# Patient Record
Sex: MALE | URBAN - NONMETROPOLITAN AREA
[De-identification: names, ages, dates, MRNs, and addresses within clinical notes are randomized per-mention and may not be internally consistent; named-entity substitution may affect disease eponyms.]

---

## 2023-03-15 ENCOUNTER — OFFICE VISIT (OUTPATIENT)
Dept: ENT CLINIC | Age: 73
End: 2023-03-15
Payer: MEDICARE

## 2023-03-15 ENCOUNTER — PROCEDURE VISIT (OUTPATIENT)
Dept: ENT CLINIC | Age: 73
End: 2023-03-15
Payer: MEDICARE

## 2023-03-15 VITALS
BODY MASS INDEX: 25.48 KG/M2 | SYSTOLIC BLOOD PRESSURE: 116 MMHG | DIASTOLIC BLOOD PRESSURE: 66 MMHG | HEIGHT: 70 IN | WEIGHT: 178 LBS

## 2023-03-15 DIAGNOSIS — H91.8X9 ASYMMETRICAL HEARING LOSS: ICD-10-CM

## 2023-03-15 DIAGNOSIS — H92.02 LEFT EAR PAIN: Primary | ICD-10-CM

## 2023-03-15 DIAGNOSIS — H92.02 ACUTE OTALGIA, LEFT: Primary | ICD-10-CM

## 2023-03-15 PROCEDURE — 92567 TYMPANOMETRY: CPT | Performed by: AUDIOLOGIST

## 2023-03-15 PROCEDURE — 99203 OFFICE O/P NEW LOW 30 MIN: CPT | Performed by: PHYSICIAN ASSISTANT

## 2023-03-15 PROCEDURE — 1123F ACP DISCUSS/DSCN MKR DOCD: CPT | Performed by: PHYSICIAN ASSISTANT

## 2023-03-15 RX ORDER — LANSOPRAZOLE 30 MG/1
CAPSULE, DELAYED RELEASE ORAL
COMMUNITY
Start: 2023-01-05

## 2023-03-15 RX ORDER — SERTRALINE HYDROCHLORIDE 100 MG/1
TABLET, FILM COATED ORAL
COMMUNITY
Start: 2023-02-28

## 2023-03-15 RX ORDER — LISINOPRIL 10 MG/1
TABLET ORAL
COMMUNITY

## 2023-03-15 RX ORDER — ATORVASTATIN CALCIUM 20 MG/1
TABLET, FILM COATED ORAL
COMMUNITY

## 2023-03-15 RX ORDER — DICLOFENAC SODIUM 75 MG/1
TABLET, DELAYED RELEASE ORAL
COMMUNITY

## 2023-03-15 RX ORDER — FLUTICASONE PROPIONATE 50 MCG
SPRAY, SUSPENSION (ML) NASAL
COMMUNITY

## 2023-03-15 ASSESSMENT — ENCOUNTER SYMPTOMS
SINUS PAIN: 0
SORE THROAT: 0
RHINORRHEA: 0
PHOTOPHOBIA: 0
EYE PAIN: 0
SINUS PRESSURE: 0
VOICE CHANGE: 0
FACIAL SWELLING: 0
TROUBLE SWALLOWING: 0

## 2023-03-15 NOTE — PROGRESS NOTES
Mr. Ivonne Schneider presented to the clinic this date for tympanometry due to complaints of left ear pain. Type A tympanograms consistent with normal TM mobility bilaterally.

## 2023-03-15 NOTE — ASSESSMENT & PLAN NOTE
Sporadic acute otalgia with asymmetrical hearing loss of the left ear  Plan: I recommended a CT of the left IAC due to a normal tympanogram and no success with conservative medications. He is to follow-up after the CAT scan to discuss the findings. I will also have him to see Elin for audiology screening prior to his appointment with me. He was reminded to call if he has any questions or problems.

## 2023-03-15 NOTE — PROGRESS NOTES
[facility] OTOLARYNGOLOGY/ENT  Mr. Chantal Charles is a pleasant 70-year-old  male that was referred by Dr. Tono Chiu due to problems with chronic otalgia to the left ear. Patient reports that this was first noted about 3 months ago. He reports that he has had 2 recent episodes of COVID and thinks this may be the etiology. He reports that the ear feels plugged up with no drainage to the canal.  He reports of sporadic stabbing pain to the left ear with no trigger points. He also denies any issues with fever or chills. He was treated with several different medications after 3 encounters with no success. Due to this persisting he is referred today for evaluation and treatment. Patient does admit to a longstanding history of seasonal allergies and has required allergy shots in the past.  Tympanogram was performed today and demonstrated type A bilaterally. Allergies: Molds & smuts      Current Outpatient Medications   Medication Sig Dispense Refill    Cetirizine HCl 10 MG CAPS cetirizine 10 mg capsule   cetirizine 10 mg capsule 1 Capsule(s) PO Take once a day      diclofenac (VOLTAREN) 75 MG EC tablet diclofenac sodium 75 mg tablet,delayed release   TAKE ONE TABLET BY MOUTH TWICE DAILY FOR 90 DAYS      atorvastatin (LIPITOR) 20 MG tablet atorvastatin 20 mg tablet   Take 1 tablet every day by oral route for 90 days. fluticasone (FLONASE) 50 MCG/ACT nasal spray fluticasone propionate 50 mcg/actuation nasal spray,suspension   INHALE TWO SPRAYS INTO EACH NOSTRIL EVERY DAY FOR 30 DAYS      lansoprazole (PREVACID) 30 MG delayed release capsule TAKE ONE CAPSULE BY MOUTH EVERY DAY      sertraline (ZOLOFT) 100 MG tablet TAKE ONE TABLET BY MOUTH EVERY DAY FOR 90 DAYS      lisinopril (PRINIVIL;ZESTRIL) 10 MG tablet lisinopril 10 mg tablet   Take 1 tablet every day by oral route for 90 days. No current facility-administered medications for this visit.        Past Surgical History:   Procedure Laterality Date    CARPAL TUNNEL RELEASE      KNEE ARTHROPLASTY      SHOULDER ARTHROPLASTY         Past Medical History:   Diagnosis Date    Anxiety and depression     Arthritis     Back pain     GERD (gastroesophageal reflux disease)     Hyperlipidemia     Hypertension        No family history on file.    Social History     Tobacco Use    Smoking status: Never    Smokeless tobacco: Never   Substance Use Topics    Alcohol use: Yes     Comment: rare           REVIEW OF SYSTEMS:  all other systems reviewed and are negative  Review of Systems   Constitutional:  Negative for chills and fever.   HENT:  Negative for congestion, dental problem, ear discharge, ear pain, facial swelling, hearing loss, postnasal drip, rhinorrhea, sinus pressure, sinus pain, sore throat, tinnitus, trouble swallowing and voice change.    Eyes:  Negative for photophobia and pain.   Neurological:  Negative for dizziness and headaches.         Comments:     PHYSICAL EXAM:    /66   Ht 5' 10\" (1.778 m)   Wt 178 lb (80.7 kg)   BMI 25.54 kg/m²   Body mass index is 25.54 kg/m².    General Appearance: well developed  and well nourished  Head/ Face: normocephalic and atraumatic  Vocal Quality: good/ normal  Ears: Right Ear: External: external ears normal Otoscopy Ear Canal: canal clear Otoscopy TM: TM's dull and TM's sluggish Left Ear: External: external ears normal Otoscopy Ear Canal: canal clear Otoscopy TM: TM's dull and TM's sluggish  Hearing: Victoria L  Nose: nares normal and septum midline  Neck: supple and adenopathy none palpable  Thyroid: normal  Oral exam demonstrated no masses or any significant findings.   + frontal sinus tenderness to percussion. L>R    Assessment & Plan:    Problem List Items Addressed This Visit       Acute otalgia, left - Primary     Sporadic acute otalgia with asymmetrical hearing loss of the left ear  Plan: I recommended a CT of the left IAC due to a normal tympanogram and no success with conservative medications.  He is to  follow-up after the CAT scan to discuss the findings. I will also have him to see Elin for audiology screening prior to his appointment with me. He was reminded to call if he has any questions or problems. Relevant Orders    CT IAC POSTERIOR FOSSA W WO CONTRAST    Asymmetrical hearing loss    Relevant Orders    CT IAC POSTERIOR FOSSA W WO CONTRAST       Orders Placed This Encounter   Procedures    CT IAC POSTERIOR FOSSA W WO CONTRAST     Standing Status:   Future     Standing Expiration Date:   3/15/2024     Scheduling Instructions:      LMP     Order Specific Question:   STAT Creatinine as needed:     Answer:   No     Order Specific Question:   Reason for exam:     Answer:   Sporadic acute left otalgia with asymmetrical hearing loss of the left ear with type a tympanogram       No orders of the defined types were placed in this encounter. Electronically signed by Konstantin Solis PA-C on 3/15/23 at 3:33 PM CDT        Please note that this chart was generated using dragon dictation software. Although every effort was made to ensure the accuracy of this automated transcription, some errors in transcription may have occurred.